# Patient Record
Sex: MALE | Race: OTHER | HISPANIC OR LATINO | Employment: FULL TIME | URBAN - METROPOLITAN AREA
[De-identification: names, ages, dates, MRNs, and addresses within clinical notes are randomized per-mention and may not be internally consistent; named-entity substitution may affect disease eponyms.]

---

## 2023-11-15 ENCOUNTER — OFFICE VISIT (OUTPATIENT)
Dept: URGENT CARE | Facility: CLINIC | Age: 18
End: 2023-11-15
Payer: OTHER MISCELLANEOUS

## 2023-11-15 VITALS
SYSTOLIC BLOOD PRESSURE: 117 MMHG | DIASTOLIC BLOOD PRESSURE: 74 MMHG | HEART RATE: 61 BPM | RESPIRATION RATE: 16 BRPM | OXYGEN SATURATION: 99 % | TEMPERATURE: 98 F

## 2023-11-15 DIAGNOSIS — Z02.6 ENCOUNTER RELATED TO WORKER'S COMPENSATION CLAIM: Primary | ICD-10-CM

## 2023-11-15 DIAGNOSIS — S41.111A LACERATION OF RIGHT UPPER EXTREMITY, INITIAL ENCOUNTER: ICD-10-CM

## 2023-11-15 DIAGNOSIS — Z02.83 ENCOUNTER FOR DRUG SCREENING: ICD-10-CM

## 2023-11-15 LAB
CTP QC/QA: YES
POC 10 PANEL DRUG SCREEN: NEGATIVE

## 2023-11-15 PROCEDURE — 80305 POCT RAPID DRUG SCREEN 10 PANEL: ICD-10-PCS | Mod: S$GLB,,, | Performed by: FAMILY MEDICINE

## 2023-11-15 PROCEDURE — 80305 DRUG TEST PRSMV DIR OPT OBS: CPT | Mod: S$GLB,,, | Performed by: FAMILY MEDICINE

## 2023-11-15 PROCEDURE — 99203 OFFICE O/P NEW LOW 30 MIN: CPT | Mod: S$GLB,,, | Performed by: FAMILY MEDICINE

## 2023-11-15 PROCEDURE — 99203 PR OFFICE/OUTPT VISIT, NEW, LEVL III, 30-44 MIN: ICD-10-PCS | Mod: S$GLB,,, | Performed by: FAMILY MEDICINE

## 2023-11-15 RX ORDER — MUPIROCIN 20 MG/G
OINTMENT TOPICAL
Qty: 22 G | Refills: 1 | Status: SHIPPED | OUTPATIENT
Start: 2023-11-15

## 2023-11-15 NOTE — LETTER
[facility] [place] [address], [address]  [facility] [address]  Phone: [phone]  Fax: [phone]  [facility]: [phone]    Pt Name: Braulio Corey  Injury Date: 11/15/2023   Employee ID:  Date of First Treatment: 11/15/2023   Company: [id number]      Appointment Time: 10:30 AM Arrived: 1050   Provider: Ryan Cantor MD Time Out:1215     Office Treatment:   1. Encounter related to worker's compensation claim    2. Encounter for drug screening    3. Laceration of right upper extremity, initial encounter      Medications Ordered This Encounter   Medications    mupirocin (BACTROBAN) 2 % ointment      Patient Instructions: Daily home exercises/warm soaks, Attention not to aggravate affected area, Keep dressing clean/dry/covered    Restrictions: Regular Duty return to work on 11/20 or sooner if tolerated.      Return Appointment: as needed       If Rx a medication that can be sedating, DO NOT TAKE DURING YOUR WORK DAY.    Some OTC measures to help in recovery(if no allergies to, renal issues or pregnant):  Tylenol 325mg 3x per day  Ibuprofen 400mg 3x per day OR Aleve regular strength one tablet 2x per day  Take Pepcid 20mg BID  If applicable or discussed: Magnesium OTC daily; Topical Voltaren Gel; Lidocaine patches  Massage area if possible  Resting of the injured area  Ice for ankle, wrist or elbow injury  Elevation of the injured area if applicable  Heating pad for muscle injury  Stretching/ROM exercises as described in clinic.   ** BE ADVISED: You should be in regular contact with your W/C  to know the status of your claim and /or (if any)pending referrals**

## 2023-11-15 NOTE — PROGRESS NOTES
Subjective:      Patient ID: Braulio Corey is a 18 y.o. male.    Chief Complaint: Laceration    WC Initial Visit. Pt works for [employer].  Pt presents with lac to right elbow- Pt was cutting a tree and another employee hit his elbow with the chain of electric chain saw.  Pain 10/10 patrice with bending    Laceration   The incident occurred 1 to 3 hours ago. The laceration is located on the Right arm. Injury mechanism: chainsaw blade. The pain is at a severity of 10/10. The pain is severe. The pain has been Constant since onset. He reports no foreign bodies present. His tetanus status is unknown.       Skin:  Positive for laceration.     Objective:     Physical Exam   Macerated lac to the right arm. Located posteriorly at distal end. FROM but pain with flexion.  Mult lacs but superficial and not amenable to repair  Assessment:      1. Encounter related to worker's compensation claim    2. Encounter for drug screening    3. Laceration of right upper extremity, initial encounter      Plan:       Medications Ordered This Encounter   Medications    mupirocin (BACTROBAN) 2 % ointment     Sig: Apply to affected area 3 times daily     Dispense:  22 g     Refill:  1     Patient Instructions: Daily home exercises/warm soaks, Attention not to aggravate affected area, Keep dressing clean/dry/covered   Restrictions: Regular Duty  No follow-ups on file.